# Patient Record
Sex: MALE | Race: WHITE
[De-identification: names, ages, dates, MRNs, and addresses within clinical notes are randomized per-mention and may not be internally consistent; named-entity substitution may affect disease eponyms.]

---

## 2017-07-09 ENCOUNTER — HOSPITAL ENCOUNTER (INPATIENT)
Dept: HOSPITAL 59 - ER | Age: 82
LOS: 2 days | Discharge: HOME | DRG: 192 | End: 2017-07-11
Attending: FAMILY MEDICINE | Admitting: FAMILY MEDICINE
Payer: MEDICARE

## 2017-07-09 DIAGNOSIS — J20.9: ICD-10-CM

## 2017-07-09 DIAGNOSIS — I48.2: ICD-10-CM

## 2017-07-09 DIAGNOSIS — J44.0: ICD-10-CM

## 2017-07-09 DIAGNOSIS — Z79.01: ICD-10-CM

## 2017-07-09 DIAGNOSIS — J44.1: Primary | ICD-10-CM

## 2017-07-09 LAB
ALBUMIN SERPL-MCNC: 4.3 GM/DL (ref 3.5–5)
ALBUMIN/GLOB SERPL: 1.1 {RATIO} (ref 1.1–1.8)
ALP SERPL-CCNC: 92 U/L (ref 38–126)
ALT SERPL-CCNC: 21 U/L (ref 21–72)
ANION GAP SERPL CALC-SCNC: 10.8 MMOL/L (ref 7–16)
APTT BLD: 44.8 SECONDS (ref 24.5–39.1)
AST SERPL-CCNC: 31 U/L (ref 17–59)
BASOPHILS NFR BLD: 0.2 % (ref 0–6)
BILIRUB SERPL-MCNC: 1.6 MG/DL (ref 0.2–1.3)
BUN SERPL-MCNC: 23 MG/DL (ref 9–20)
CO2 SERPL-SCNC: 31.2 MMOL/L (ref 22–30)
CREAT SERPL-MCNC: 1.2 MG/DL (ref 0.66–1.25)
EOSINOPHIL NFR BLD: 0.1 % (ref 0–6)
ERYTHROCYTE [DISTWIDTH] IN BLOOD BY AUTOMATED COUNT: 13.4 % (ref 11.5–14.5)
EST GLOMERULAR FILTRATION RATE: > 60 ML/MIN
GLOBULIN SER-MCNC: 3.8 GM/DL (ref 1.4–4.8)
GLUCOSE SERPL-MCNC: 123 MG/DL (ref 70–110)
GRANULOCYTES NFR BLD: 79 % (ref 47–80)
HCT VFR BLD CALC: 46.7 % (ref 42–52)
HGB BLD-MCNC: 16.2 GM/DL (ref 14–18)
INR PPP: 2.39
LYMPHOCYTES NFR BLD AUTO: 6.8 % (ref 16–45)
MCH RBC QN AUTO: 34.3 PG (ref 27–33)
MCHC RBC AUTO-ENTMCNC: 34.7 G/DL (ref 32–36)
MCV RBC AUTO: 98.9 FL (ref 81–97)
MONOCYTES NFR BLD: 13.9 % (ref 0–9)
PLATELET # BLD: 228 K/UL (ref 130–400)
PMV BLD AUTO: 9.5 FL (ref 7.4–10.4)
PROT SERPL-MCNC: 8.1 GM/DL (ref 6.3–8.2)
PROTHROMBIN TIME: 27 SECONDS (ref 9.5–12.1)
RBC # BLD AUTO: 4.72 M/UL (ref 4.4–5.7)
TROPONIN I SERPL-MCNC: < 0.012 NG/ML (ref 0–0.03)
WBC # BLD AUTO: 16.9 K/UL (ref 4.2–12.2)

## 2017-07-09 PROCEDURE — 84484 ASSAY OF TROPONIN QUANT: CPT

## 2017-07-09 PROCEDURE — 96374 THER/PROPH/DIAG INJ IV PUSH: CPT

## 2017-07-09 PROCEDURE — 94760 N-INVAS EAR/PLS OXIMETRY 1: CPT

## 2017-07-09 PROCEDURE — 85027 COMPLETE CBC AUTOMATED: CPT

## 2017-07-09 PROCEDURE — 93005 ELECTROCARDIOGRAM TRACING: CPT

## 2017-07-09 PROCEDURE — 94640 AIRWAY INHALATION TREATMENT: CPT

## 2017-07-09 PROCEDURE — 99285 EMERGENCY DEPT VISIT HI MDM: CPT

## 2017-07-09 PROCEDURE — 85730 THROMBOPLASTIN TIME PARTIAL: CPT

## 2017-07-09 PROCEDURE — 93041 RHYTHM ECG TRACING: CPT

## 2017-07-09 PROCEDURE — 99233 SBSQ HOSP IP/OBS HIGH 50: CPT

## 2017-07-09 PROCEDURE — 80053 COMPREHEN METABOLIC PANEL: CPT

## 2017-07-09 PROCEDURE — 94761 N-INVAS EAR/PLS OXIMETRY MLT: CPT

## 2017-07-09 PROCEDURE — 85025 COMPLETE CBC W/AUTO DIFF WBC: CPT

## 2017-07-09 PROCEDURE — 85610 PROTHROMBIN TIME: CPT

## 2017-07-09 PROCEDURE — 99223 1ST HOSP IP/OBS HIGH 75: CPT

## 2017-07-09 PROCEDURE — 93010 ELECTROCARDIOGRAM REPORT: CPT

## 2017-07-09 PROCEDURE — 12011 RPR F/E/E/N/L/M 2.5 CM/<: CPT

## 2017-07-09 PROCEDURE — 83880 ASSAY OF NATRIURETIC PEPTIDE: CPT

## 2017-07-09 PROCEDURE — 71020: CPT

## 2017-07-09 RX ADMIN — METHYLPREDNISOLONE SODIUM SUCCINATE SCH MG: 125 INJECTION, POWDER, FOR SOLUTION INTRAMUSCULAR; INTRAVENOUS at 22:23

## 2017-07-09 RX ADMIN — CEFTRIAXONE SODIUM SCH MLS/HR: 1 INJECTION, POWDER, FOR SOLUTION INTRAMUSCULAR; INTRAVENOUS at 22:23

## 2017-07-09 RX ADMIN — IPRATROPIUM BROMIDE AND ALBUTEROL SULFATE SCH ML: .5; 2.5 SOLUTION RESPIRATORY (INHALATION) at 21:23

## 2017-07-09 RX ADMIN — FLUTICASONE PROPIONATE AND SALMETEROL SCH PUFF: 50; 500 POWDER RESPIRATORY (INHALATION) at 17:13

## 2017-07-09 RX ADMIN — FLUTICASONE PROPIONATE AND SALMETEROL SCH: 50; 500 POWDER RESPIRATORY (INHALATION) at 17:20

## 2017-07-09 RX ADMIN — IPRATROPIUM BROMIDE AND ALBUTEROL SULFATE SCH ML: .5; 2.5 SOLUTION RESPIRATORY (INHALATION) at 17:13

## 2017-07-09 NOTE — EMERGENCY DEPARTMENT RECORD
History of Present Illness





- General


Chief Complaint: Shortness of breath


Stated Complaint: HELENA


Time Seen by Provider: 07/09/17 13:25


Source: Patient, Family


Mode of Arrival: Ambulatory


Limitations: No limitations





- History of Present Illness


Initial Comments: 


82 yo male presents with cough, green sputum, and shortness of breath.  He has 

a history of COPD.  Last admission was 10/2016.  He normally only uses oxygen 

at night but has had to increase his oxygen the last 2 days.  No fevers.  He 

has difficulty with coughing up the sputum.  No chest pain.  He is a former 

smoker that quit 27 years ago.  No NVD.  He has some sore throat and ear 

congestion as well.





MD Complaint: Cough, Shortness of breath


-: Days(s) (1)


Quality: Aching


Consistency: Intermittent


Improves With: Other (cough)


Worsens With: Coughing, Movement


Known History Of: COPD


Context: Recent illness, Recent URI


Associated Symptoms: Cough


Treatments Prior to Arrival: Bronchodilator





- Related Data


Home Oxygen Amount: 1 Liter


 Home Medications











 Medication  Instructions  Recorded  Confirmed  Last Taken


 


Albuterol Sulfate [Ventolin Hfa] 2 puff INH Q2H PRN #90  10/20/16 07/09/17 

Unknown


 


Ipratropium/Albuterol Sulfate 1 puff INH Q6H #12  10/20/16 07/09/17 Unknown





[Combivent Respimat Inhal Spray]    


 


Sotalol HCl [Sotalol] 80 mg PO DAILY #90  10/20/16 07/09/17 2 Days Ago





    ~10/19/16


 


Warfarin Sodium 5 mg PO MOTH #90  10/20/16 07/09/17 Unknown


 


Warfarin Sodium [Coumadin] 2.5 mg PO SUTUWEFRSA 10/20/16 07/09/17 2 Days Ago





    ~10/19/16








 Previous Rx's











 Medication  Instructions  Recorded


 


Fluticasone/Salmeterol 500/50 1 puff INH RESP.Q12H #1 disk 10/23/16





[Advair 500/50]  











 Allergies











Allergy/AdvReac Type Severity Reaction Status Date / Time


 


No Known Drug Allergies Allergy   Verified 10/20/16 17:09














Review of Systems


Constitutional: Denies: Chills, Fever, Malaise, Weakness


Eyes: Denies: Eye discharge, Eye pain, Photophobia, Vision change


ENT: Reports: Congestion, Throat pain


Respiratory: Reports: Cough, Dyspnea, Wheezes


Cardiovascular: Reports: Palpitations.  Denies: Chest pain, Syncope


Endocrine: Denies: Fatigue, Polydipsia, Polyuria


Gastrointestinal: Denies: Abdominal pain, Diarrhea, Nausea, Vomiting


Genitourinary: Denies: Dysuria, Frequency, Hematuria


Musculoskeletal: Denies: Arthralgia, Back pain, Myalgia, Neck pain


Skin: Denies: Bruising, Change in color, Rash


Neurological: Denies: Confusion, Headache


Psychiatric: Denies: Anxiety


Hematological/Lymphatic: Denies: Blood Clots, Easy bleeding, Easy bruising, 

Swollen glands





Past Medical History





- SOCIAL HISTORY


Smoking Status: Former smoker


Drug Use: None





- RESPIRATORY


Hx Respiratory Disorders: Yes


Hx Bronchitis: Yes


Hx COPD: Yes





- CARDIOVASCULAR


Hx Cardio Disorders: Yes


Hx Irregular Heartbeat: Yes (afib)





- NEURO


Hx Neuro Disorders: No





- GI


Hx GI Disorders: No





- 


Hx Genitourinary Disorders: No





- ENDOCRINE


Hx Endocrine Disorders: No





- MUSCULOSKELETAL


Hx Musculoskeletal Disorders: No





- PSYCH


Hx Psych Problems: No





- HEMATOLOGY/ONCOLOGY


Hx Hematology/Oncology Disorders: Yes


Hx Clotting Problems: Yes (coumadin)





Family Medical History


Hx Cancer: Brother/Sister


Hx Stroke: Mother





Physical Exam





- General


General Appearance: Alert, Oriented x3, Cooperative, No acute distress


Limitations: No limitations





- Head


Head exam: Atraumatic, Normal inspection





- Eye


Eye exam: Normal appearance, PERRL.  negative: Conjunctival injection, 

Periorbital swelling





- ENT


ENT exam: Normal exam, Mucous membranes moist.  negative: Normal external ear 

exam (wax)


Ear exam: Normal external inspection


Nasal Exam: Normal inspection


Mouth exam: Normal external inspection


Teeth exam: Normal inspection


Throat exam: Normal inspection.  negative: Tonsillar erythema, Tonsillomegaly, 

Tonsillar exudate, R peritonsillar mass, L peritonsillar mass





- Neck


Neck exam: Normal inspection, Full ROM.  negative: Tenderness





- Respiratory


Respiratory exam: Accessory muscle use, Decreased breath sounds, Prolonged 

expiratory, Rhonchi, Wheezes.  negative: Respiratory distress





- Cardiovascular


Cardiovascular Exam: Irregular rhythm, Tachycardia


Peripheral Pulses: 2+: Radial (R), Radial (L)





- GI/Abdominal


GI/Abdominal exam: Soft.  negative: Distended, Guarding, Rebound, Rigid, 

Tenderness





- Rectal


Rectal exam: Deferred





- 


 exam: Deferred





- Extremities


Extremities exam: Normal inspection, Full ROM, Normal capillary refill.  

negative: Pedal edema, Tenderness





- Back


Back exam: Reports: Normal inspection, Full ROM.  Denies: CVA tenderness (R), 

CVA tenderness (L), Muscle spasm, Paraspinal tenderness, Rash noted, Tenderness

, Vertebral tenderness





- Neurological


Neurological exam: Alert, Normal gait, Oriented X3





- Psychiatric


Psychiatric exam: Normal affect, Normal mood.  negative: Agitated, Anxious





- Skin


Skin exam: Dry, Intact, Normal color, Warm.  negative: Cyanosis, Diaphoretic, 

Erythema, Mottled





Course





- Reevaluation(s)


Reevaluation #1: 


EKG 13:53 Afib, rate 88, intervals , Axis normal, ST no acute changes 

RBBB


07/09/17 13:57


No change since October as it was Afib, with RBBB





Brown sputum sample provided.


07/09/17 13:59





Reevaluation #2: 


The labs were reviewed


WBC count was 16 with a Hgb of 16


INR is 2.4


BNP is 3930


CXR is negative for infiltrate.  CW COPD


07/09/17 14:52





Reevaluation #3: 


I NARDA Martin regarding admission for dyspnea, COPD, bronchitis


07/09/17 14:58








Medical Decision Making





- Lab Data


Result diagrams: 


 07/09/17 13:45





 07/09/17 13:45





Disposition


Disposition: Admit


Clinical Impression: 


 Bronchitis





COPD (chronic obstructive pulmonary disease)


Qualifiers:


 COPD type: unspecified COPD Qualified Code(s): J44.9 - Chronic obstructive 

pulmonary disease, unspecified





Disposition: Still a Patient at [facility]


Decision to Admit: Admit from ER


Decision to Admit Date: 07/09/17


Decision to Admit Time: 14:53


Condition: (1) Good


Time of Disposition: 14:53





Quality





- Quality Measures


Quality Measures: N/A





- Blood Pressure Screening


View Details: Yes


Blood Pressure Classification: Hypertensive Reading


Systolic Measurement: 109


Diastolic Measurement: 93


Screening for High Blood Pressure: < Normal BP, F/U Not Required > []


Normal BP Follow-up Interventions: No follow-up required


Pre-Hypertensive Follow-up Interventions: Referral to alternative/primary care 

provider.

## 2017-07-10 LAB
ERYTHROCYTE [DISTWIDTH] IN BLOOD BY AUTOMATED COUNT: 13.2 % (ref 11.5–14.5)
HCT VFR BLD CALC: 42.1 % (ref 42–52)
HGB BLD-MCNC: 14.4 GM/DL (ref 14–18)
INR PPP: 2.26
LYMPHOCYTES NFR BLD: 2 % (ref 16–45)
MCH RBC QN AUTO: 33.9 PG (ref 27–33)
MCHC RBC AUTO-ENTMCNC: 34.2 G/DL (ref 32–36)
MCV RBC AUTO: 99.3 FL (ref 81–97)
MONOCYTES NFR BLD: 7 % (ref 0–9)
NEUTROPHILS NFR BLD AUTO: 91 % (ref 47–80)
PLATELET # BLD: 197 K/UL (ref 130–400)
PMV BLD AUTO: 9 FL (ref 7.4–10.4)
PROTHROMBIN TIME: 25.5 SECONDS (ref 9.5–12.1)
RBC # BLD AUTO: 4.24 M/UL (ref 4.4–5.7)
WBC # BLD AUTO: 16 K/UL (ref 4.2–12.2)

## 2017-07-10 RX ADMIN — METHYLPREDNISOLONE SODIUM SUCCINATE SCH MG: 125 INJECTION, POWDER, FOR SOLUTION INTRAMUSCULAR; INTRAVENOUS at 14:20

## 2017-07-10 RX ADMIN — IPRATROPIUM BROMIDE AND ALBUTEROL SCH: 20; 100 SPRAY, METERED RESPIRATORY (INHALATION) at 22:14

## 2017-07-10 RX ADMIN — AZITHROMYCIN SCH MG: 500 TABLET, FILM COATED ORAL at 10:41

## 2017-07-10 RX ADMIN — METHYLPREDNISOLONE SODIUM SUCCINATE SCH MG: 125 INJECTION, POWDER, FOR SOLUTION INTRAMUSCULAR; INTRAVENOUS at 21:40

## 2017-07-10 RX ADMIN — IPRATROPIUM BROMIDE AND ALBUTEROL SULFATE SCH ML: .5; 2.5 SOLUTION RESPIRATORY (INHALATION) at 06:08

## 2017-07-10 RX ADMIN — IPRATROPIUM BROMIDE AND ALBUTEROL SCH PUFF: 20; 100 SPRAY, METERED RESPIRATORY (INHALATION) at 13:55

## 2017-07-10 RX ADMIN — IPRATROPIUM BROMIDE AND ALBUTEROL SULFATE SCH: .5; 2.5 SOLUTION RESPIRATORY (INHALATION) at 10:36

## 2017-07-10 RX ADMIN — IPRATROPIUM BROMIDE AND ALBUTEROL SCH PUFF: 20; 100 SPRAY, METERED RESPIRATORY (INHALATION) at 20:56

## 2017-07-10 RX ADMIN — METHYLPREDNISOLONE SODIUM SUCCINATE SCH MG: 125 INJECTION, POWDER, FOR SOLUTION INTRAMUSCULAR; INTRAVENOUS at 08:37

## 2017-07-10 RX ADMIN — CEFTRIAXONE SODIUM SCH MLS/HR: 1 INJECTION, POWDER, FOR SOLUTION INTRAMUSCULAR; INTRAVENOUS at 09:21

## 2017-07-10 RX ADMIN — IPRATROPIUM BROMIDE AND ALBUTEROL SULFATE SCH ML: .5; 2.5 SOLUTION RESPIRATORY (INHALATION) at 01:58

## 2017-07-10 RX ADMIN — CEFTRIAXONE SODIUM SCH MLS/HR: 1 INJECTION, POWDER, FOR SOLUTION INTRAMUSCULAR; INTRAVENOUS at 21:39

## 2017-07-10 RX ADMIN — IPRATROPIUM BROMIDE AND ALBUTEROL SCH PUFF: 20; 100 SPRAY, METERED RESPIRATORY (INHALATION) at 17:13

## 2017-07-10 NOTE — RADIOLOGY REPORT
EXAM:  CHEST, TWO VIEWS



HISTORY:  DIFFICULTY BREATHING. 



TECHNIQUE:  Frontal and lateral views of the chest were obtained.  



Comparison:  10/20/16 chest.  



FINDINGS:  The heart size is normal.  Atheromatous change thoracic aorta.  
Osteopenia.  COPD.  The lungs are clear.  No pneumothorax.  



IMPRESSION:  

COPD.  THE LUNGS ARE CLEAR.  



JOB NUMBER:  [id number]
MTDD

## 2017-07-10 NOTE — HISTORY AND PHYSICAL REPORT
DATE OF ADMISSION:  07/09/2017



CHIEF COMPLAINT:  Dyspnea, cough. 



HISTORY OF CHIEF COMPLAINT:  This 81-year-old male presents with cough, green sputum, short of 
breath.  He has a history of COPD.  He uses home oxygen only at night, but in the last 2 or 3 days, 
has been using it 24 hours a day.  He has no fever.  He has some difficulty with coughing.  The 
sputum is thick.  He denies any chest pain.  He is a former smoker who quit 27 years ago.  No 
nausea, vomiting, diarrhea.  He has a sore throat and ear congestion as well.  He is on Coumadin for 
chronic atrial fibrillation at 2.5 six days a week and 5 mg 1 day a week. 



PAST MEDICAL HISTORY:  COPD, atrial fibrillation, skin cancer of the right arm, removed.  



PAST SURGICAL HISTORY:  Skin cancer removed from the arm. 



MEDICATIONS ON ADMISSION: Combivent 2 puffs every 6 hours, Advair 500/50 1 puff b.i.d.  Ventolin 2 
puffs every 4 hours p.r.n.  Coumadin 2.5 x 6 days a week and 5 mg x 1 day a week, on Thursday.  
Sotalol 80 mg daily. 



ALLERGIES:  No known drug allergies. 



FAMILY PSYCHOSOCIAL HISTORY:  Brother and sister had cancer.  Mother had a stroke.  His cigarette 
smoking stopped in 1993.  No alcohol or drug use. 



REVIEW OF SYSTEMS:  HEENT:  See Chief Complaint.  He has a cough, cold, and sore throat for the last 
2 or 3 days.  Cardiovascular:  No chest pain, palpitations, or arrhythmias.  Respiratory:  See Chief 
Complaint.  He has a cough, congestion, and sputum is thick.   Gastrointestinal:  No nausea, 
vomiting, diarrhea, black stools, or bloody stools.  Genitourinary:  No dysuria, hematuria, 
frequency, or burning on urination.  Musculoskeletal:  Diffuse joint problems, but no specific 
painful joint.  Neurologic:  No CVA, paralysis, or paresthesias.  Endocrine:  No diabetes or thyroid 
disease.  Integument:  No rash, ulcers, changes in moles, or yellow skin. 



PHYSICAL EXAMINATION:

VITAL SIGNS:  Height 6'.  Weight 117 pounds.  Temperature 98.2.  Pulse 89.  Blood pressure 108/76.  
Respiratory rate 18.  Pulse ox 96% on 2 liters of O2. 

HEENT:  Pupils equal, round, and reactive to light and accommodation.  Extraocular muscles intact.  
Throat is clear.  Nose is clear.  Tympanic membranes gray. 

NECK:  Supple.  No jugular venous distention.  No hepatojugular reflux.  No carotid bruits.  Thyroid 
is smooth. 

CARDIOVASCULAR:  Regular rate and rhythm without murmurs, clicks, rubs, or gallops. 

RESPIRATORY:  Decreased breath sounds bilaterally, and coarse breath sounds with some wheezing 
bilaterally. 

ABDOMEN:  Soft, nontender, no hepatosplenomegaly.  No masses or tenderness.  Bowel sounds active.  
No bruits. 

EXTREMITIES:  No pitting edema.  No cyanosis or clubbing.  Full range of motion.  Peripheral pulses 
good. 

BREASTS:  Deferred

GENITALIA:  Deferred. 

RECTAL:  Deferred. 

NEUROLOGIC:  Cranial nerves II-XII intact.  No gross deficits.  Sensation normal.  Strength normal.  
Deep tendon reflexes equal bilaterally.  Babinski is negative. 

MENTAL STATUS:  Alert and oriented x3. 



IMPRESSION:

1.  Acute bronchitis. 

2.  Acute exacerbation of COPD. 

3.  Hypoxia requiring home oxygen more than his typical, 2 liters per minute nasal cannula. 



PLAN:  IV Rocephin 1 gram every 12 hours.  Solu-Medrol 80 mg every 8 hours.  Breathing treatments of 
DuoNebs every 4 hours and every 2 hours of albuterol p.r.n. 

MTDD

## 2017-07-11 LAB
INR PPP: 3.03
PROTHROMBIN TIME: 34.2 SECONDS (ref 9.5–12.1)

## 2017-07-11 RX ADMIN — AZITHROMYCIN SCH MG: 500 TABLET, FILM COATED ORAL at 09:15

## 2017-07-11 RX ADMIN — IPRATROPIUM BROMIDE AND ALBUTEROL SCH PUFF: 20; 100 SPRAY, METERED RESPIRATORY (INHALATION) at 05:59

## 2017-07-11 RX ADMIN — METHYLPREDNISOLONE SODIUM SUCCINATE SCH MG: 125 INJECTION, POWDER, FOR SOLUTION INTRAMUSCULAR; INTRAVENOUS at 06:09

## 2017-07-11 RX ADMIN — IPRATROPIUM BROMIDE AND ALBUTEROL SCH: 20; 100 SPRAY, METERED RESPIRATORY (INHALATION) at 10:55

## 2017-07-11 RX ADMIN — CEFTRIAXONE SODIUM SCH: 1 INJECTION, POWDER, FOR SOLUTION INTRAMUSCULAR; INTRAVENOUS at 09:15

## 2017-07-11 NOTE — DISCHARGE NOTE
VTE H&P Assessment





- Risk for VTE


Risk for VTE: Yes


Risk Level: Moderate


Risk Assessment Date: 07/09/17


Risk Assessment Time: 19:00


VTE Orders Placed or Will Be Placed: Yes





Discharge Medications





- Discharge Medications


Prescriptions: 


Azithromycin 250 mg PO DAILY #6 tablet


Prednisone [Prednisone 10Mg] 10 mg PO ASDIR #30 tab


Home Medications: 


 Ambulatory Orders





Albuterol Sulfate [Ventolin Hfa] 2 puff INH Q2H PRN #90  10/20/16 [Last Taken 

Unknown]


Ipratropium/Albuterol Sulfate [Combivent Respimat Inhal Spray] 1 puff INH Q6H #

12  10/20/16 [Last Taken Unknown]


Sotalol HCl [Sotalol] 80 mg PO QHS #90  10/20/16 [Last Taken 2 Days Ago ~10/19/

16]


Warfarin Sodium [Coumadin] 2.5 mg PO SUMOTUWEFRSA 10/20/16 [Last Taken 2 Days 

Ago ~10/19/16]


Fluticasone/Salmeterol 500/50 [Advair 500/50] 1 puff INH RESP.Q12H #1 disk 10/23

/16 [Last Taken Unknown]


Azithromycin 250 mg PO DAILY #6 tablet 07/11/17 [Last Taken Unknown]


Ipratropium/Albuterol Sulfate [Combivent] 1 puff INH RESP.Q4H.WA  inhaler 07/11/ 17 [Last Taken Unknown]


Prednisone [Prednisone 10Mg] 10 mg PO ASDIR #30 tab 07/11/17 [Last Taken Unknown

]


Warfarin Sodium [Coumadin] 2.5 mg PO SuTuWeThFrSa 07/11/17 [Last Taken Unknown]











Discharge Note





- Date


Date of Discharge Note: 07/11/17


Condition: (1) Good


Additional Instructions: 


No coumadin today and only take 2.5 mg per day starting tomorrow 


See dr. Glass in 7 to 10 days


prednisone taper from 40 mg  


zpak one a day till gone





Forms:  Patient Portal Access

## 2017-07-12 NOTE — DISCHARGE SUMMARY
DATE OF DISCHARGE:  07/11/2017



DISCHARGE DIAGNOSES:

1. Acute bronchitis. 

2. Acute exacerbation of COPD. 

3. Hypoxia requiring home oxygen at 2 liters per minute nasal cannula. 

4. Chronic atrial fibrillation, on Coumadin. 



PRIMARY PHYSICIAN:  Dr. Glass



ATTENDING PHYSICIAN:  Edvin Martin D.O. 



REASON FOR HOSPITALIZATION:  Dyspnea, cough. 



HISTORY OF PRESENT ILLNESS:  This 81-year-old male presents with a cough, green sputum, and 
shortness of breath.  He has a history of COPD and uses home oxygen at night only, but the last 2 or 
3 days has been using it 24 hours a day.  He has no fever.  He has some difficulty with the coughing 
because his sputum is very thick.  He is a former smoker.  He quit 27 years ago.  No nausea, 
vomiting, or diarrhea.  He has a sore throat and ear congestion as well.  He is on Coumadin for 
atrial fibrillation.  His normal dose is 2.5 mg for 6 days a week and 5 mg 1 day a week; however, we 
are going to drop that down because he is on azithromycin.  



SIGNIFICANT FINDINGS FROM EVALUATION:  Chest x-ray:  COPD.  The lungs are clear. 



LABORATORY:  WBC on discharge 16,000, most likely from the Solu-Medrol, and hemoglobin is 14.4.  His 
PT/INR is 3.03.  We will not give him Coumadin today and then only use 2.5 mg until he is off the 
azithromycin and have it rechecked.  Potassium is 4.6, BUN 23, creatinine 1.2.  Brain natriuretic 
peptide is 3,930.  Troponin I is negative.  



THERAPY PROVIDED:  The patient was given IV Solu-Medrol, Rocephin 1 gram every 12 hours, and 
azithromycin, cautiously watching the INRs, 500 mg a day, and I have discharged him with only going 
to 250 mg a day with the Z-Gagandeep.  His heart rate has been controlled throughout.  



HOSPITAL COURSE:  He gradually improved. 



CONDITION AT DISCHARGE:  Improved. 



DISCHARGE INSTRUCTIONS:  Follow up with Dr. Glass in 7-10 days.  Azithromycin 250 mg once a day 
for 5-6 days.  No Coumadin today; start the Coumadin at 2.5 mg tomorrow and then do not take a 5 mg 
dose until after he is off azithromycin.  Prednisone will start at 40 mg a day, 10 mg pills, 4 pills 
a day for 3 days, 3 pills a day for 3 days, 2 pills a day for 3 days, and 1 pill a day for 3 days.  
Continue his home medications:  Sotalol 80 mg each night, Combivent inhaler 1 puff every 6 hours , 
Advair 500/50 1 puff b.i.d., Ventolin 2 puffs every 4 hours p.r.n.  

MTDD

## 2019-07-05 ENCOUNTER — HOSPITAL ENCOUNTER (EMERGENCY)
Dept: HOSPITAL 59 - ER | Age: 84
Discharge: HOME | End: 2019-07-05
Payer: MEDICARE

## 2019-07-05 DIAGNOSIS — Z87.891: ICD-10-CM
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
## 2019-07-05 DIAGNOSIS — J44.9: ICD-10-CM

## 2019-07-05 DIAGNOSIS — Z99.81: ICD-10-CM

## 2019-07-05 DIAGNOSIS — R91.1: ICD-10-CM

## 2019-07-05 DIAGNOSIS — R06.02: ICD-10-CM

## 2019-07-05 DIAGNOSIS — Z79.01: ICD-10-CM

## 2019-07-05 DIAGNOSIS — S20.211A: ICD-10-CM

## 2019-07-05 DIAGNOSIS — I48.91: ICD-10-CM

## 2019-07-05 DIAGNOSIS — S32.010A: Primary | ICD-10-CM

## 2019-07-05 DIAGNOSIS — S70.02XA: ICD-10-CM

## 2019-07-05 PROCEDURE — 74176 CT ABD & PELVIS W/O CONTRAST: CPT

## 2019-07-05 PROCEDURE — 70450 CT HEAD/BRAIN W/O DYE: CPT

## 2019-07-05 PROCEDURE — 94010 BREATHING CAPACITY TEST: CPT

## 2019-07-05 PROCEDURE — 71250 CT THORAX DX C-: CPT

## 2019-07-05 PROCEDURE — 99284 EMERGENCY DEPT VISIT MOD MDM: CPT

## 2019-07-05 NOTE — EMERGENCY DEPARTMENT RECORD
History of Present Illness





- General


Chief Complaint: Fall Injury


Stated Complaint: FELL 7 DAYS AGO/SHORT OF BREATH


Time Seen by Provider: 07/05/19 08:52


Source: Patient, Family


Mode of Arrival: Ambulatory


Limitations: No limitations





- History of Present Illness


Initial Comments: 





84 yo male presents with right lower rib pain and left lateral posterior pelvic 

pain after a fall in the garden 7 days ago.  He fell backwards.  He did not hit 

his head.  His only concern is the right lower rib and left lateral pelvis/hip. 

No headaches, dizziness, nausea.  No neck pain.  The pain effects his breathing 

at times if he is moving a lot.  He has COPD on oxygen.  He is not short of 

breath resting in a chair.  No other specific complaints.  He is eating, drin

viktor, and taking care of ADLs.  


MD Complaint: Fall


-: Week(s) (1)


Fall From: Standing (Fell back in the garden)


When Fall Occurred: # Days PTA (7)


Fall Witnessed: Yes, by family


Place Fall Occurred: Home


Loss of Consciousness: None


Prolonged Down Time?: No


Symptoms Prior to Fall: None


Location: Chest, Back, Pelvis


Quality: Aching


Context: Other


Associated Symptoms: Denies





- [place] Coma Scale


Eye Response: (4) Open spontaneously


Motor Response: (6) Obeys commands


Verbal Response: (5) Oriented


[place] Total: 15





- Related Data


                                  Previous Rx's











 Medication  Instructions  Recorded


 


Fluticasone/Salmeterol 500/50 1 puff INH RESP.Q12H #1 disk 10/23/16





[Advair 500/50]  


 


Hydrocodone/Acetaminophen [Norco 1 each PO Q6H PRN #12 tablet 07/05/19





5-325 Tablet]  











                                    Allergies











Allergy/AdvReac Type Severity Reaction Status Date / Time


 


No Known Drug Allergies Allergy   Verified 07/05/19 08:54














Review of Systems


Constitutional: Denies: Chills, Fever, Malaise, Weakness


Eyes: Denies: Eye discharge


ENT: Denies: Congestion, Throat pain


Respiratory: Reports: As per HPI, Dyspnea.  Denies: Cough, Hemoptysis, Stridor, 

Wheezes


Cardiovascular: Reports: As per HPI, Chest pain, Dyspnea on exertion.  Denies: 

Edema, Palpitations, Syncope


Endocrine: Denies: Fatigue, Polydipsia, Polyuria


Gastrointestinal: Denies: Abdominal pain, Diarrhea, Nausea, Vomiting


Genitourinary: Denies: Dysuria, Frequency, Hematuria


Musculoskeletal: Reports: Back pain.  Denies: Arthralgia, Joint swelling, 

Myalgia, Neck pain


Skin: Denies: Bruising, Change in color, Rash


Neurological: Denies: Headache, Numbness, Weakness


Psychiatric: Denies: Anxiety


Hematological/Lymphatic: Denies: Easy bleeding, Easy bruising





Past Medical History





- SOCIAL HISTORY


Smoking Status: Former smoker


Drug Use: None





- RESPIRATORY


Hx Respiratory Disorders: Yes


Hx Bronchitis: Yes


Hx COPD: Yes


Comment:: home O2-2L at all times





- CARDIOVASCULAR


Hx Cardio Disorders: Yes


Hx Irregular Heartbeat: Yes (afib)





- NEURO


Hx Neuro Disorders: No





- GI


Hx GI Disorders: No





- 


Hx Genitourinary Disorders: No





- ENDOCRINE


Hx Endocrine Disorders: No


Hx Diabetes: No


Hx Thyroid Disease: No





- MUSCULOSKELETAL


Hx Musculoskeletal Disorders: No


Hx Arthritis: No





- PSYCH


Hx Psych Problems: No





- HEMATOLOGY/ONCOLOGY


Hx Hematology/Oncology Disorders: Yes


Hx Clotting Problems: Yes (coumadin)





Family Medical History


Hx Cancer: Brother/Sister


Hx Stroke: Mother





Physical Exam





- General


General Appearance: Alert, Oriented x3, Cooperative, No acute distress, Other 

(Conversational, no conversational dyspnea)


Limitations: No limitations





- Head


Head exam: Atraumatic, Normocephalic, Normal inspection


Head exam detail: negative: Abrasion, Contusion, Hematoma





- Eye


Eye exam: Normal appearance, PERRL.  negative: Conjunctival injection, Scleral 

icterus





- ENT


ENT exam: Normal exam, Mucous membranes moist


Ear exam: Normal external inspection


Nasal Exam: Normal inspection


Mouth exam: Normal external inspection





- Neck


Neck exam: Normal inspection, Full ROM.  negative: Tenderness





- Respiratory


Respiratory exam: Chest wall tenderness (right posterior lateral lower ribs), 

Decreased breath sounds.  negative: Accessory muscle use, Prolonged expiratory, 

Respiratory distress, Rhonchi, Stridor, Wheezes





- Cardiovascular


Cardiovascular Exam: Regular rate, Normal rhythm, Normal heart sounds


Peripheral Pulses: 2+: Radial (R), Radial (L)





- GI/Abdominal


GI/Abdominal exam: Soft.  negative: Distended, Guarding, Tenderness





- Rectal


Rectal exam: Deferred





- 


 exam: Deferred





- Extremities


Extremities exam: Normal inspection, Full ROM.  negative: Calf tenderness, 

Normal capillary refill, Pedal edema, Tenderness


Image of Full Body: 


                            __________________________














                            __________________________





 1 - normal inspection, no bruise, tender lower rib margin, breath sounds 

present





 2 - normal inspection, tender, no swelling





 3 - normal inspection of back, no abrasions or bruises, no tenderness of the 

spine from the neck to the tip of the coccyx








- Back


Back exam: Denies: CVA tenderness (R), CVA tenderness (L), Paraspinal 

tenderness, Tenderness, Vertebral tenderness





- Neurological


Neurological exam: Alert, Oriented X3.  negative: Motor sensory deficit





- Psychiatric


Psychiatric exam: Normal affect, Normal mood





- Skin


Skin exam: Dry, Intact, Normal color, Warm





Course





- Reevaluation(s)


Reevaluation #1: 


The patient was seen and examined.  He is on Coumadin.  He did not hit his head.

 He did not and has not had any symptoms to suggest head injury initially or 

delayed.  No headache, no dizziness, no nausea, no confusion.  He is generally 

appearing comfortable.  No limitation in history, no dyspnea apparent on 

evaluation, no conversational dyspnea.  He sits up and moves freely pointing out

where his injuries occurred.  95% on his oxygen.  He denies feeling current 

short of breath and declined a breathing treatment at this time.  


07/05/19 09:03


   


07/05/19 10:05


The HCT was negative for acute process.





The patient was prescribed a controlled substance.


The prescription does not exceed three days.


MAPS was reviewed at the time of the prescripts


The topics of abuse, addiction, over dose, dangers of multiple medications, 

disposal, and illegal distribution were discussed with the patient. 


The patient verbalized understanding of the risks of the medication being 

provided





I/S was provided by RT in the ED to ensure he is taking good breaths at home


07/05/19 10:19


The CT was reviewed with the radiologist.


Mild L1 compression with minimal retropulsion without canal involvement no 

central canal stenosis.  Incidental `15mm lung node that will need a follow up 

PET scan.  small effusions.  Calcified coronary and aortic valve. 





The patient was informed of the results.  He tolerates the pain well.  No 

history of or current neurologic symptoms of the lower extremities.  He will 

call Monday for follow up with his PCP.  He additionally was told he will need a

PET scan per the recommendation of the radiologist.


07/05/19 10:29


CT Abdomen /Pelvis reviewed.  L1 compression 20%.  Minimal retropusion.  No 

central canal stenosis.





The patient is doing well and is comfortable with DC.  No dyspnea or 

desaturations.


DC vitals were reviewed.


We discussed at length reasons to immediately return to the ED as well as close 

follow up.  


He performed very well with incentive spirometer.


The patient will call the PCP Monday for close follow up of this ED visit to 

review this visit and the tests performed











Disposition


Disposition: Discharge


Clinical Impression: 


 Compression fracture, Lung nodule





Contusion of rib on right side


Qualifiers:


 Encounter type: initial encounter Qualified Code(s): S20.211A - Contusion of ri

ght front wall of thorax, initial encounter





Contusion of hip, left


Qualifiers:


 Encounter type: initial encounter Qualified Code(s): S70.02XA - Contusion of 

left hip, initial encounter





Disposition: Home, Self-Care


Condition: (1) Good


Instructions:  Vertebral Compression Fracture (ED), Pulmonary Nodules (ED)


Additional Instructions: 


Call your doctor for the next available follow up appointment first of the week 

to discuss the lung nodule and PET scan as well as the compression fracture


Review this ER visit and the tests performed with your family doctor


Return to the ER for a recheck if worse, any new concerns or questions, any 

shortness of breath, headaches or new questions.


Take the prescriptions provided as directed for pain


Use the incentive spirometer as directed to ensure you are taking deep breaths.


You have a 15 mm nodule in the lung.  The radiologist recommends a PET scan as 

an outpatient to see if it looks suspicious for cancer.  


Prescriptions: 


Hydrocodone/Acetaminophen [Norco 5-325 Tablet] 1 each PO Q6H PRN #12 tablet


 PRN Reason: Pain - Mild (1-4)


Forms:  Patient Portal Access


Time of Disposition: 10:30





Quality





- Quality Measures


Quality Measures: N/A





- Blood Pressure Screening


Does Patient Have Any of the Following: No


Blood Pressure Classification: Pre-Hypertensive BP Reading


Systolic Measurement: 136


Diastolic Measurement: 82


Screening for High Blood Pressure: < Pre-Hypertensive BP, F/U Documented > [G8

950]


Pre-Hypertensive Follow-up Interventions: Referral to alternative/primary care 

provider.